# Patient Record
Sex: MALE | URBAN - METROPOLITAN AREA
[De-identification: names, ages, dates, MRNs, and addresses within clinical notes are randomized per-mention and may not be internally consistent; named-entity substitution may affect disease eponyms.]

---

## 2024-07-09 ENCOUNTER — HOSPITAL ENCOUNTER (OUTPATIENT)
Dept: LAB | Age: 54
Setting detail: SPECIMEN
Discharge: HOME | End: 2024-07-12

## 2024-07-09 PROCEDURE — 86735 MUMPS ANTIBODY: CPT

## 2024-07-09 PROCEDURE — 86765 RUBEOLA ANTIBODY: CPT

## 2024-07-09 PROCEDURE — 86762 RUBELLA ANTIBODY: CPT

## 2024-07-09 PROCEDURE — 36415 COLL VENOUS BLD VENIPUNCTURE: CPT

## 2024-07-09 PROCEDURE — 86787 VARICELLA-ZOSTER ANTIBODY: CPT

## 2024-10-02 ENCOUNTER — TELEPHONE (OUTPATIENT)
Dept: GASTROENTEROLOGY | Age: 54
End: 2024-10-02

## 2024-10-02 ENCOUNTER — TELEPHONE (OUTPATIENT)
Age: 54
End: 2024-10-02

## 2024-10-02 ENCOUNTER — OFFICE VISIT (OUTPATIENT)
Age: 54
End: 2024-10-02

## 2024-10-02 ENCOUNTER — PREP FOR PROCEDURE (OUTPATIENT)
Dept: GASTROENTEROLOGY | Age: 54
End: 2024-10-02

## 2024-10-02 VITALS
BODY MASS INDEX: 24.6 KG/M2 | HEIGHT: 78 IN | RESPIRATION RATE: 17 BRPM | WEIGHT: 212.6 LBS | OXYGEN SATURATION: 99 % | DIASTOLIC BLOOD PRESSURE: 80 MMHG | HEART RATE: 62 BPM | SYSTOLIC BLOOD PRESSURE: 118 MMHG

## 2024-10-02 DIAGNOSIS — D50.9 IRON DEFICIENCY ANEMIA, UNSPECIFIED IRON DEFICIENCY ANEMIA TYPE: Primary | ICD-10-CM

## 2024-10-02 DIAGNOSIS — Z86.0100 HX OF COLONIC POLYP: ICD-10-CM

## 2024-10-02 DIAGNOSIS — K21.9 CHRONIC GERD: ICD-10-CM

## 2024-10-02 DIAGNOSIS — Z12.11 ENCOUNTER FOR SCREENING COLONOSCOPY: Primary | ICD-10-CM

## 2024-10-02 RX ORDER — SODIUM CHLORIDE 0.9 % (FLUSH) 0.9 %
5-40 SYRINGE (ML) INJECTION PRN
Status: CANCELLED | OUTPATIENT
Start: 2024-10-02

## 2024-10-02 RX ORDER — SODIUM CHLORIDE 0.9 % (FLUSH) 0.9 %
5-40 SYRINGE (ML) INJECTION EVERY 12 HOURS SCHEDULED
Status: CANCELLED | OUTPATIENT
Start: 2024-10-02

## 2024-10-02 RX ORDER — OMEPRAZOLE 40 MG/1
40 CAPSULE, DELAYED RELEASE ORAL DAILY
COMMUNITY

## 2024-10-02 RX ORDER — PREGABALIN 25 MG/1
75 CAPSULE ORAL 2 TIMES DAILY
COMMUNITY

## 2024-10-02 RX ORDER — SODIUM, POTASSIUM,MAG SULFATES 17.5-3.13G
1 SOLUTION, RECONSTITUTED, ORAL ORAL ONCE
Qty: 1 EACH | Refills: 0 | Status: SHIPPED | OUTPATIENT
Start: 2024-10-02 | End: 2024-10-02

## 2024-10-02 RX ORDER — FAMOTIDINE 40 MG/1
40 TABLET, FILM COATED ORAL EVERY EVENING
Qty: 90 TABLET | Refills: 0 | Status: SHIPPED | OUTPATIENT
Start: 2024-10-02 | End: 2024-10-02 | Stop reason: SDUPTHER

## 2024-10-02 RX ORDER — CELECOXIB 100 MG/1
100 CAPSULE ORAL 2 TIMES DAILY
COMMUNITY

## 2024-10-02 RX ORDER — FAMOTIDINE 40 MG/1
40 TABLET, FILM COATED ORAL EVERY EVENING
Qty: 90 TABLET | Refills: 0 | Status: SHIPPED | OUTPATIENT
Start: 2024-10-02

## 2024-10-02 RX ORDER — SODIUM CHLORIDE 9 MG/ML
25 INJECTION, SOLUTION INTRAVENOUS PRN
Status: CANCELLED | OUTPATIENT
Start: 2024-10-02

## 2024-10-02 NOTE — TELEPHONE ENCOUNTER
SUPREP Bowel Preparation - Split Dosing  If you are scheduled at our                                          If you are scheduled at our                                 [facility] [place]:                                                         [facility] [place]:    [address], [facility] [place] [address]                                                               [place] [address]  [phone]       Items to purchase 5 days before your procedure:    Suprep -  prescription at your pharmacy.  Purchase one 10oz bottle of Magnesium Citrate  Purchase Dulcolax Laxative tablets (Not stool softener tablets).      Two days before your procedure:      Drink at least eight glasses of water today.  Stop eating high- fiber foods such as vegetables and beans until after your colonoscopy. You can eat all other types of food today.     Drink the 10 oz bottle of magnesium citrate after dinner.      The day before your Colonoscopy:    Clear liquids only the entire day (water, Gatorade, coffee, tea, Sprite, 7-up, Jell-O, popsicles, chicken / beef broth, apple juice).    No milk / No dairy products, NO RED, BLUE or PURPLE color liquids /dyes    4:00 pm Take 2 Dulcolax tablets.     6:00 pm - Pour one 6 oz bottle of Suprep liquid into the mixing container. Add cold water to the 16-oz line and mix. Drink all the solution over 10-15 minutes.   Drink two more 16-ounce glasses of water over the next hour.            The day of your procedure:    6 hours prior to arrival time of your procedure, pour one 6 oz bottle of Suprep liquid into the mixing container. Add cold water to the 16-oz line and mix. Drink all the solution over 10-15 minutes.

## 2024-10-02 NOTE — PROGRESS NOTES
Dawson Knight (:  1970) is a 54 y.o. male new patient referred to our office for evaluation of the following chief complaint(s):  New Patient        Assessment & Plan   ASSESSMENT/PLAN:  1. Iron deficiency anemia, unspecified iron deficiency anemia type  2. Chronic GERD  -     famotidine (PEPCID) 40 MG tablet; Take 1 tablet by mouth every evening, Disp-90 tablet, R-0Normal  3. Hx of colonic polyp    -Schedule EGD and colonoscopy. Reportedly had polyps in 2019. No prior EGD. Has uncontrolled nocturnal acid reflux despite Prilosec 40 mg BID AC. Add Pepcid 40 mg QHS. Follow-up after procedures to discuss results/adjust medications PRN.     Subjective   SUBJECTIVE/OBJECTIVE  Dawson Knight is a 54 y.o. year old male referred to our office for screening colonoscopy as well as EGD for prolonged PPI use.  [facility]  Noted to have persistent GERD despite Prilosec 40 mg twice daily with no prior EGD.  Also with history of ZOILA, Hgb 11.4.  Last colonoscopy May 2019 with polyps and 5-year recall.  Pathology not available for review.    Today patient denies nausea, vomiting, melena, dysphagia, odynophagia. He reports a 3+ year hx of alternating constipation, diarrhea, normal stools. Says this is intermittent. Typically has BM daily, sometimes more loose, sometimes more on the hard side. Rarely skips days. Occasionally has noticed BRBPR in the bowl and with wiping. Believes he may have internal hemorrhoids. Denies rectal pain or itching. Has some occasional epigastric pain associated with acid reflux. This typically wakes him up two hours after going to bed with burning in the esophagus. Typically drinks water or takes Tums for relief.  Denies significant postprandial symptoms. These symptoms are occurring despite Prilosec 40 mg BID AC started by cardiologist after cardiac work-up including stress test was negative for chest pain. Has previously tried

## 2024-10-02 NOTE — TELEPHONE ENCOUNTER
\"Lin Brian Amanda, RN  Hi,  Please send Suprep to the pharmacy on file.  Thanks,  Lin\"      Suprep and Pepcid prescriptions faxed to pt's [facility] pharmacy as ordered by Melissa Grinnell, PA. Fax: [phone].    Pharmacy info:   [facility], located within [facility]   [address], [place]   Phone: [phone]  Fax: [phone]

## 2024-10-18 RX ORDER — LISINOPRIL AND HYDROCHLOROTHIAZIDE 10; 12.5 MG/1; MG/1
1 TABLET ORAL DAILY
COMMUNITY

## 2024-10-18 NOTE — PERIOP NOTE
Patient verified name, , and procedure.    Type: 1a; abbreviated assessment per anesthesia guidelines    Labs per anesthesia: None    Instructed pt that they will be notified the day before their procedure by the GI Lab for time of arrival if their procedure is [place] and Pre-op for [place] cases. Arrival times should be called by 5 pm. If no phone is received the patient should contact their respective hospital. The GI lab telephone number is [phone] and ES Pre-op is [phone].     Follow diet and prep instructions per office including NPO status.      **Please drink 32 ounces of non-caffeinated clear liquids, on the day of surgery, 2 hours prior to your arrival time to avoid dehydration.     Bath or shower the night before and the am of surgery with non-moisturizing soap. No lotions, oils, powders, cologne on skin. No make up, eye make up or jewelry. Wear loose fitting comfortable, clean clothing.     Must have adult present in building the entire time .     Medications for the day of procedure Lyrica, Omeprazole, patient to hold vitamins, supplements, herbals 7 days prior to procedure and NSAIDs/Celebrex 5 days prior to procedure per anesthesia guidelines.     The following discharge instructions reviewed with patient: medication given during procedure may cause drowsiness for several hours, therefore, do not drive or operate machinery for remainder of the day. You may not drink alcohol on the day of your procedure, please resume regular diet and activity unless otherwise directed. You may experience abdominal distention for several hours that is relieved by the passage of gas. Contact your physician if you have any of the following: fever or chills, severe abdominal pain or excessive amount of bleeding or a large amount when having a bowel movement. Occasional specks of blood with bowel movement would not be unusual.

## 2024-10-25 ENCOUNTER — TELEPHONE (OUTPATIENT)
Age: 54
End: 2024-10-25

## 2024-10-25 DIAGNOSIS — Z12.11 ENCOUNTER FOR SCREENING COLONOSCOPY: Primary | ICD-10-CM

## 2024-10-25 RX ORDER — BISACODYL 5 MG/1
5 TABLET, DELAYED RELEASE ORAL SEE ADMIN INSTRUCTIONS
Qty: 2 TABLET | Refills: 0 | Status: SHIPPED | OUTPATIENT
Start: 2024-10-25

## 2024-10-25 RX ORDER — POLYETHYLENE GLYCOL 3350 17 G/17G
238 POWDER, FOR SOLUTION ORAL SEE ADMIN INSTRUCTIONS
Qty: 238 G | Refills: 0 | Status: SHIPPED | OUTPATIENT
Start: 2024-10-25

## 2024-10-25 NOTE — TELEPHONE ENCOUNTER
Received the following message regarding pt's colonoscopy prep -- per Juliette, \"patient is scheduled for colon with Alhaji on 10/30 [facility] / [facility] is [facility] the [facility] before [facility] / patient is requesting a call back asap.\"    -----------    Called pt to further discuss/confirm we send correct prep to the correct fax number for him to get prep in time for 10/30 procedure. Pt stated that his [facility] insurance is not covering Suprep and they are requesting an alternative. Pt to drop off form from [facility] today 10/25 around 1130, and medication order will be faxed after he drops off form. Told pt next alternative will most likely be Miralax OTC so we can try faxing rx if he thinks it will be covered, but otherwise he will pick it up OTC. Pt agreeable.

## 2024-10-25 NOTE — TELEPHONE ENCOUNTER
Pt presented to clinic to drop off hard copy of a letter from [facility] stating that Suprep will not be covered for his procedure. After reviewing form, based on options given by [facility] that will be approved and pt can obtain in time to prep prior to procedure 10/30, pt must be switched to Miralax Prep (including Miralax, Dulcolax, and Magnesium Citrate). Discussed with Dr. Mane's , Lin. Copy of Colonoscopy Prep instructions for Miralax/Dulcolax/Gatorade prep with extra Magnesium Citrate 10oz on 10/28 reviewed with pt in person by Lin, and hard copy given to pt to take home. Told pt we would reach out to [facility] today and attempt to fax RX, but if pt does not receive them all in time he must plan to  Miralax, Magnesium Citrate, and Dulcolax all OTC prior to 10/28. Pt verbalized understanding, agreeable to plan.     Copy of Letter from [facility] faxed to [facility] [employer] to upload to pt's chart. Confirmation fax received.    -----------    Miralax, Dulcolax, Magnesium Citrate prescriptions sent to [facility] via electronic fax as requested.

## 2024-10-29 ENCOUNTER — TELEPHONE (OUTPATIENT)
Age: 54
End: 2024-10-29

## 2024-10-29 NOTE — TELEPHONE ENCOUNTER
Called pt to confirm he was able to  everything he needed to start prep in time for colonoscopy tomorrow. Pt stated he did not receive anything [facility] the [facility] but ended up picking up everything himself OTC, and started by taking the Magnesium Citrate 10 oz last night 10/28 as instructed, and is following clear liquid diet today as instructed. Pt denied any questions at this time. Told pt to expect a call from pre-op today 10/29 between 1-4pm with his arrival time. Pt verbalized understanding.

## 2024-10-30 ENCOUNTER — HOSPITAL ENCOUNTER (OUTPATIENT)
Age: 54
Discharge: HOME | End: 2024-10-30
Attending: INTERNAL MEDICINE | Admitting: INTERNAL MEDICINE
Payer: OTHER GOVERNMENT

## 2024-10-30 ENCOUNTER — ANESTHESIA (OUTPATIENT)
Dept: ENDOSCOPY | Age: 54
End: 2024-10-30
Payer: OTHER GOVERNMENT

## 2024-10-30 ENCOUNTER — ANESTHESIA EVENT (OUTPATIENT)
Dept: ENDOSCOPY | Age: 54
End: 2024-10-30
Payer: OTHER GOVERNMENT

## 2024-10-30 VITALS
OXYGEN SATURATION: 97 % | RESPIRATION RATE: 15 BRPM | HEIGHT: 71 IN | TEMPERATURE: 98 F | BODY MASS INDEX: 28.98 KG/M2 | SYSTOLIC BLOOD PRESSURE: 104 MMHG | WEIGHT: 207 LBS | DIASTOLIC BLOOD PRESSURE: 65 MMHG | HEART RATE: 57 BPM

## 2024-10-30 PROCEDURE — 3700000000 HC ANESTHESIA ATTENDED CARE: Performed by: INTERNAL MEDICINE

## 2024-10-30 PROCEDURE — 3609027000 HC COLONOSCOPY: Performed by: INTERNAL MEDICINE

## 2024-10-30 PROCEDURE — 6360000002 HC RX W HCPCS

## 2024-10-30 PROCEDURE — 3700000001 HC ADD 15 MINUTES (ANESTHESIA): Performed by: INTERNAL MEDICINE

## 2024-10-30 PROCEDURE — 2580000003 HC RX 258: Performed by: INTERNAL MEDICINE

## 2024-10-30 PROCEDURE — 2500000003 HC RX 250 WO HCPCS

## 2024-10-30 PROCEDURE — 7100000011 HC PHASE II RECOVERY - ADDTL 15 MIN: Performed by: INTERNAL MEDICINE

## 2024-10-30 PROCEDURE — 2709999900 HC NON-CHARGEABLE SUPPLY: Performed by: INTERNAL MEDICINE

## 2024-10-30 PROCEDURE — 3609012400 HC EGD TRANSORAL BIOPSY SINGLE/MULTIPLE: Performed by: INTERNAL MEDICINE

## 2024-10-30 PROCEDURE — 7100000010 HC PHASE II RECOVERY - FIRST 15 MIN: Performed by: INTERNAL MEDICINE

## 2024-10-30 PROCEDURE — 88305 TISSUE EXAM BY PATHOLOGIST: CPT

## 2024-10-30 RX ORDER — SODIUM CHLORIDE 0.9 % (FLUSH) 0.9 %
5-40 SYRINGE (ML) INJECTION PRN
Status: DISCONTINUED | OUTPATIENT
Start: 2024-10-30 | End: 2024-10-30 | Stop reason: HOSPADM

## 2024-10-30 RX ORDER — SODIUM CHLORIDE 0.9 % (FLUSH) 0.9 %
5-40 SYRINGE (ML) INJECTION EVERY 12 HOURS SCHEDULED
Status: DISCONTINUED | OUTPATIENT
Start: 2024-10-30 | End: 2024-10-30 | Stop reason: HOSPADM

## 2024-10-30 RX ORDER — SODIUM CHLORIDE 9 MG/ML
25 INJECTION, SOLUTION INTRAVENOUS PRN
Status: DISCONTINUED | OUTPATIENT
Start: 2024-10-30 | End: 2024-10-30 | Stop reason: HOSPADM

## 2024-10-30 RX ORDER — LIDOCAINE HYDROCHLORIDE 20 MG/ML
INJECTION, SOLUTION EPIDURAL; INFILTRATION; INTRACAUDAL; PERINEURAL
Status: DISCONTINUED | OUTPATIENT
Start: 2024-10-30 | End: 2024-10-30 | Stop reason: SDUPTHER

## 2024-10-30 RX ORDER — PROPOFOL 10 MG/ML
INJECTION, EMULSION INTRAVENOUS
Status: DISCONTINUED | OUTPATIENT
Start: 2024-10-30 | End: 2024-10-30 | Stop reason: SDUPTHER

## 2024-10-30 RX ADMIN — SODIUM CHLORIDE, PRESERVATIVE FREE 5 ML: 5 INJECTION INTRAVENOUS at 09:10

## 2024-10-30 RX ADMIN — PROPOFOL 180 MCG/KG/MIN: 10 INJECTION, EMULSION INTRAVENOUS at 09:39

## 2024-10-30 RX ADMIN — LIDOCAINE HYDROCHLORIDE 50 MG: 20 INJECTION, SOLUTION EPIDURAL; INFILTRATION; INTRACAUDAL; PERINEURAL at 09:38

## 2024-10-30 RX ADMIN — PROPOFOL 50 MG: 10 INJECTION, EMULSION INTRAVENOUS at 09:38

## 2024-10-30 NOTE — H&P
HISTORY AND PHYSICAL             Date: 10/30/2024        Patient Name: Dawson Knight     YOB: 1970      Age:  54 y.o.      History of Present Illness   1. Iron deficiency anemia, unspecified iron deficiency anemia type  2. Chronic GERD  3. Hx of colonic polyp    Past Medical History     Past Medical History:   Diagnosis Date    GERD (gastroesophageal reflux disease)     managed with medication    Hypertension     managed with medication    Iron deficiency anemia     ADRIANA on CPAP         Past Surgical History     Past Surgical History:   Procedure Laterality Date    HIP ARTHROSCOPY W/ LABRAL REPAIR Right     ROTATOR CUFF REPAIR Right         Medications Prior to Admission     Prior to Admission medications    Medication Sig Start Date End Date Taking? Authorizing Provider   polyethylene glycol (GLYCOLAX) 17 GM/SCOOP powder Take 238 g by mouth See Admin Instructions -- Refer to colonoscopy prep instructions from [facility]. Call [phone] with any questions. 10/25/24  Yes Nate Mane MD   bisacodyl 5 MG EC tablet Take 1 tablet by mouth See Admin Instructions -- Refer to colonoscopy prep instructions from [facility]. Call [phone] with any questions. 10/25/24  Yes Nate Mane MD   magnesium citrate solution Take 296 mLs by mouth See Admin Instructions -- Refer to colonoscopy prep instructions from [facility]. Call [phone] with any questions. 10/25/24  Yes Nate Mane MD   lisinopril-hydroCHLOROthiazide (PRINZIDE;ZESTORETIC) 10-12.5 MG per tablet Take 1 tablet by mouth daily   Yes Rosalina Bowie MD   omeprazole (PRILOSEC) 40 MG delayed release capsule Take 1 capsule by mouth daily   Yes Rosalina Bowie MD   celecoxib (CELEBREX) 100 MG capsule Take 1 capsule by mouth 2 times daily   Yes Rosalina Bowie MD   pregabalin (LYRICA) 25 MG capsule Take 3 capsules by mouth 2 times

## 2024-10-30 NOTE — DISCHARGE INSTRUCTIONS
Gastrointestinal Esophagogastroduodenoscopy (EGD)/ Endoscopic Ultrasound(EUS)- Upper Exam Discharge Instructions    1. Call Dr. Mane for any problems or questions.  2. Contact the doctor's office for follow up appointment as directed.  3. Medication may cause drowsiness for several hours, therefore, do not drive or operate machinery for remainder of the day.  4. No alcohol today.  5. Do not make any important decisions such as signing legal paperwork.  6. Ordinarily, you may resume regular diet and activity after exam unless otherwise specified by your physician.  7. For mild soreness in your throat you may use Cepacol throat lozenges or warm  salt-water gargles as needed.    Findings: Evidence of a gastric bypass was found.   A gastric pouch with a normal size was found. The staple line appeared intact.   The gastrojejunal anastomosis was characterized by healthy appearing mucosa. This was traversed.   The pouch-to-jejunum limb was characterized by healthy appearing mucosa. The jejunojejunal anastomosis was characterized by healthy appearing mucosa.   Diffuse mild inflammation characterized by erythema was found in the gastric pouch. Biopsies were taken with a cold forceps for Helicobacter pylori testing.   The Z-line was irregular. Biopsies were taken with a cold forceps for histology.     Impression: Gastric bypass with a normal-sized pouch and intact staple line.   Gastrojejunal anastomosis characterized by healthy appearing mucosa.   Gastritis, characterized by erythema. Biopsied.   Z-line irregular. Biopsied.     Recommendation: Await pathology results.   Consider IV iron if ongoing issues with anemia, as iron deficiency is common in patients with gastric bypass.  Continue with current medications.   Avoid NSAIDs like Celebrex, Ibuprofen, Motrin       Gastrointestinal Colonoscopy/Flexible Sigmoidoscopy - Lower Exam Discharge Instructions  Call Dr. Mane for any problems or questions.  Contact the doctor’s

## 2024-10-30 NOTE — ANESTHESIA PRE PROCEDURE
List   Diagnosis Code    Iron deficiency anemia D50.9    Chronic GERD K21.9    Hx of colonic polyp Z86.0100       Past Medical History:        Diagnosis Date    GERD (gastroesophageal reflux disease)     managed with medication    Hypertension     managed with medication    Iron deficiency anemia     ADRIANA on CPAP        Past Surgical History:        Procedure Laterality Date    HIP ARTHROSCOPY W/ LABRAL REPAIR Right     ROTATOR CUFF REPAIR Right        Social History:    Social History     Tobacco Use    Smoking status: Never    Smokeless tobacco: Never   Substance Use Topics    Alcohol use: Yes     Comment: occ                                Counseling given: Not Answered      Vital Signs (Current):   Vitals:    10/18/24 1008   Weight: 93.9 kg (207 lb)   Height: 1.791 m (5' 10.5\")                                              BP Readings from Last 3 Encounters:   10/02/24 118/80       NPO Status: Time of last liquid consumption: 0400                        Time of last solid consumption: 1800                        Date of last liquid consumption: 10/30/24                        Date of last solid food consumption: 10/28/24    BMI:   Wt Readings from Last 3 Encounters:   10/18/24 93.9 kg (207 lb)   10/02/24 96.4 kg (212 lb 9.6 oz)     Body mass index is 29.28 kg/m².    CBC: No results found for: \"WBC\", \"RBC\", \"HGB\", \"HCT\", \"MCV\", \"RDW\", \"PLT\"    CMP: No results found for: \"NA\", \"K\", \"CL\", \"CO2\", \"BUN\", \"CREATININE\", \"GFRAA\", \"AGRATIO\", \"LABGLOM\", \"GLUCOSE\", \"GLU\", \"CALCIUM\", \"BILITOT\", \"ALKPHOS\", \"AST\", \"ALT\"    POC Tests: No results for input(s): \"POCGLU\", \"POCNA\", \"POCK\", \"POCCL\", \"POCBUN\", \"POCHEMO\", \"POCHCT\" in the last 72 hours.    Coags: No results found for: \"PROTIME\", \"INR\", \"APTT\"    HCG (If Applicable): No results found for: \"PREGTESTUR\", \"PREGSERUM\", \"HCG\", \"HCGQUANT\"     ABGs: No results found for: \"PHART\", \"PO2ART\", \"JDA8UOO\", \"JUE0CZV\", \"BEART\", \"K9ZSNZXQ\"     Type & Screen (If Applicable):  No results

## 2024-10-30 NOTE — ANESTHESIA POSTPROCEDURE EVALUATION
Department of Anesthesiology  Postprocedure Note    Patient: Dawson Knight  MRN: [id number]  YOB: 1970  Date of evaluation: 10/30/2024    Procedure Summary       Date: 10/30/24 [facility] / [facility]    Anesthesia Start: 0932 Anesthesia Stop: 1004    Procedures:       COLORECTAL CANCER SCREENING, NOT HIGH RISK      ESOPHAGOGASTRODUODENOSCOPY BIOPSY (Upper GI Region) Diagnosis:       Iron deficiency anemia, unspecified iron deficiency anemia type      Chronic GERD      Hx of colonic polyp      (Iron deficiency anemia, unspecified iron deficiency anemia type [D50.9])      (Chronic GERD [K21.9])      (Hx of colonic polyp [Z86.0100])    Surgeons: Nate Mane MD Responsible Provider: Phani Pulido MD    Anesthesia Type: TIVA ASA Status: 2            Anesthesia Type: No value filed.    Gloria Phase I:      Gloria Phase II: Gloria Score: 10    Anesthesia Post Evaluation    Patient location during evaluation: PACU  Patient participation: complete - patient participated  Level of consciousness: awake and alert  Airway patency: patent  Nausea & Vomiting: no nausea and no vomiting  Cardiovascular status: hemodynamically stable  Respiratory status: acceptable, nonlabored ventilation and spontaneous ventilation  Hydration status: euvolemic  Comments: /65   Pulse 57   Temp 98 °F (36.7 °C) (Temporal)   Resp 15   Ht 1.791 m (5' 10.5\")   Wt 93.9 kg (207 lb)   SpO2 97%   BMI 29.28 kg/m²     Multimodal analgesia pain management approach  Pain management: adequate and satisfactory to patient    No notable events documented.

## 2024-11-05 ENCOUNTER — TELEPHONE (OUTPATIENT)
Age: 54
End: 2024-11-05

## 2024-11-05 NOTE — TELEPHONE ENCOUNTER
Reviewed result with patient per . Left voicemail for patient to return call to schedule an follow up in office with pretty. Recall placed. If any questions or concerns patient can return call regarding results.

## 2024-12-19 NOTE — PROGRESS NOTES
NEW PATIENT INTAKE        Referral Diagnosis: ZOILA    Referring Provider: Tess Islas DO     Primary Care Provider: N/A    Presenting Symptoms: 3+ year hx of alternating constipation, diarrhea, normal stools. Has noticed BRBPR in the bowl and with wiping     Family History of Cancer: None    Personal History of Cancer: None    Family/ Social/ Medical/ Surgical History Updated  and Accurate in Epic: Yes    Chronological History of Pertinent Events (Last 3 months):   Colonoscopy and Endoscopy on 10/30/24 at [facility]. Pathology report in chart under Labs.  Colonoscopy revealed: Internal hemorrhoids were found during retroflexion. The hemorrhoids were moderate. The perianal and digital rectal examinations were normal. Multiple small-mouthed diverticula were found in the descending colon and sigmoid colon.     Pertinent Notes from Referring Provider: None    Other Pertinent Information: Patient has had a gastric bypass  Procedure note from Colonoscopy and  Endoscopy in [facility]

## 2024-12-20 ENCOUNTER — HOSPITAL ENCOUNTER (OUTPATIENT)
Dept: LAB | Age: 54
Discharge: HOME | End: 2024-12-20
Payer: OTHER GOVERNMENT

## 2024-12-20 ENCOUNTER — OFFICE VISIT (OUTPATIENT)
Dept: ONCOLOGY | Age: 54
End: 2024-12-20
Payer: OTHER GOVERNMENT

## 2024-12-20 VITALS
DIASTOLIC BLOOD PRESSURE: 85 MMHG | OXYGEN SATURATION: 97 % | SYSTOLIC BLOOD PRESSURE: 117 MMHG | BODY MASS INDEX: 29.94 KG/M2 | TEMPERATURE: 98 F | RESPIRATION RATE: 18 BRPM | HEIGHT: 71 IN | HEART RATE: 61 BPM | WEIGHT: 213.9 LBS

## 2024-12-20 DIAGNOSIS — D50.9 IRON DEFICIENCY ANEMIA, UNSPECIFIED IRON DEFICIENCY ANEMIA TYPE: ICD-10-CM

## 2024-12-20 DIAGNOSIS — D50.9 IRON DEFICIENCY ANEMIA, UNSPECIFIED IRON DEFICIENCY ANEMIA TYPE: Primary | ICD-10-CM

## 2024-12-20 LAB
25(OH)D3 SERPL-MCNC: 29.6 NG/ML (ref 30–100)
ALBUMIN SERPL-MCNC: 3.8 G/DL (ref 3.5–5)
ALBUMIN/GLOB SERPL: 1.2 (ref 1–1.9)
ALP SERPL-CCNC: 71 U/L (ref 40–129)
ALT SERPL-CCNC: 23 U/L (ref 8–55)
ANION GAP SERPL CALC-SCNC: 11 MMOL/L (ref 7–16)
AST SERPL-CCNC: 48 U/L (ref 15–37)
BASOPHILS # BLD: 0.1 K/UL (ref 0–0.2)
BASOPHILS NFR BLD: 1 % (ref 0–2)
BILIRUB SERPL-MCNC: <0.2 MG/DL (ref 0–1.2)
BUN SERPL-MCNC: 13 MG/DL (ref 6–23)
CALCIUM SERPL-MCNC: 9.6 MG/DL (ref 8.8–10.2)
CHLORIDE SERPL-SCNC: 103 MMOL/L (ref 98–107)
CO2 SERPL-SCNC: 28 MMOL/L (ref 20–29)
CREAT SERPL-MCNC: 0.93 MG/DL (ref 0.8–1.3)
DIFFERENTIAL METHOD BLD: ABNORMAL
EOSINOPHIL # BLD: 0.1 K/UL (ref 0–0.8)
EOSINOPHIL NFR BLD: 2 % (ref 0.5–7.8)
ERYTHROCYTE [DISTWIDTH] IN BLOOD BY AUTOMATED COUNT: 19.5 % (ref 11.9–14.6)
FERRITIN SERPL-MCNC: 6 NG/ML (ref 8–388)
FOLATE SERPL-MCNC: 8.1 NG/ML (ref 3.1–17.5)
GLOBULIN SER CALC-MCNC: 3.3 G/DL (ref 2.3–3.5)
GLUCOSE SERPL-MCNC: 91 MG/DL (ref 70–99)
HCT VFR BLD AUTO: 39.8 % (ref 41.1–50.3)
HGB BLD-MCNC: 11.6 G/DL (ref 13.6–17.2)
HGB RETIC QN AUTO: 19 PG (ref 29–35)
IMM GRANULOCYTES # BLD AUTO: 0 K/UL (ref 0–0.5)
IMM GRANULOCYTES NFR BLD AUTO: 0 % (ref 0–5)
IMM RETICS NFR: 16.4 % (ref 2.3–13.4)
IRON SATN MFR SERPL: 2 % (ref 20–50)
IRON SERPL-MCNC: 10 UG/DL (ref 35–100)
LYMPHOCYTES # BLD: 1.5 K/UL (ref 0.5–4.6)
LYMPHOCYTES NFR BLD: 22 % (ref 13–44)
MAGNESIUM SERPL-MCNC: 2.2 MG/DL (ref 1.8–2.4)
MCH RBC QN AUTO: 19.1 PG (ref 26.1–32.9)
MCHC RBC AUTO-ENTMCNC: 29.1 G/DL (ref 31.4–35)
MCV RBC AUTO: 65.7 FL (ref 82–102)
MONOCYTES # BLD: 0.7 K/UL (ref 0.1–1.3)
MONOCYTES NFR BLD: 10 % (ref 4–12)
NEUTS SEG # BLD: 4.5 K/UL (ref 1.7–8.2)
NEUTS SEG NFR BLD: 65 % (ref 43–78)
NRBC # BLD: 0 K/UL (ref 0–0.2)
PLATELET # BLD AUTO: 400 K/UL (ref 150–450)
PMV BLD AUTO: 8.5 FL (ref 9.4–12.3)
POTASSIUM SERPL-SCNC: 4.3 MMOL/L (ref 3.5–5.1)
PROT SERPL-MCNC: 7.1 G/DL (ref 6.3–8.2)
RBC # BLD AUTO: 6.06 M/UL (ref 4.23–5.6)
RETICS # AUTO: 0.05 M/UL (ref 0.03–0.1)
RETICS/RBC NFR AUTO: 0.9 % (ref 0.3–2)
SODIUM SERPL-SCNC: 142 MMOL/L (ref 136–145)
TIBC SERPL-MCNC: 436 UG/DL (ref 240–450)
UIBC SERPL-MCNC: 426 UG/DL (ref 112–347)
VIT B12 SERPL-MCNC: 968 PG/ML (ref 193–986)
WBC # BLD AUTO: 6.9 K/UL (ref 4.3–11.1)

## 2024-12-20 PROCEDURE — 82746 ASSAY OF FOLIC ACID SERUM: CPT

## 2024-12-20 PROCEDURE — 82306 VITAMIN D 25 HYDROXY: CPT

## 2024-12-20 PROCEDURE — 82728 ASSAY OF FERRITIN: CPT

## 2024-12-20 PROCEDURE — 99243 OFF/OP CNSLTJ NEW/EST LOW 30: CPT | Performed by: INTERNAL MEDICINE

## 2024-12-20 PROCEDURE — 85046 RETICYTE/HGB CONCENTRATE: CPT

## 2024-12-20 PROCEDURE — 83540 ASSAY OF IRON: CPT

## 2024-12-20 PROCEDURE — 36415 COLL VENOUS BLD VENIPUNCTURE: CPT

## 2024-12-20 PROCEDURE — 84630 ASSAY OF ZINC: CPT

## 2024-12-20 PROCEDURE — 83921 ORGANIC ACID SINGLE QUANT: CPT

## 2024-12-20 PROCEDURE — 85025 COMPLETE CBC W/AUTO DIFF WBC: CPT

## 2024-12-20 PROCEDURE — 80053 COMPREHEN METABOLIC PANEL: CPT

## 2024-12-20 PROCEDURE — 82525 ASSAY OF COPPER: CPT

## 2024-12-20 PROCEDURE — 82607 VITAMIN B-12: CPT

## 2024-12-20 PROCEDURE — 83735 ASSAY OF MAGNESIUM: CPT

## 2024-12-20 PROCEDURE — 83550 IRON BINDING TEST: CPT

## 2024-12-20 RX ORDER — TRAZODONE HYDROCHLORIDE 100 MG/1
1 TABLET ORAL DAILY
COMMUNITY

## 2024-12-20 RX ORDER — SODIUM CHLORIDE 0.9 % (FLUSH) 0.9 %
5-40 SYRINGE (ML) INJECTION PRN
OUTPATIENT
Start: 2024-12-27

## 2024-12-20 RX ORDER — ONDANSETRON 2 MG/ML
8 INJECTION INTRAMUSCULAR; INTRAVENOUS
OUTPATIENT
Start: 2024-12-27

## 2024-12-20 RX ORDER — DIPHENHYDRAMINE HYDROCHLORIDE 50 MG/ML
50 INJECTION INTRAMUSCULAR; INTRAVENOUS
OUTPATIENT
Start: 2024-12-27

## 2024-12-20 RX ORDER — FAMOTIDINE 10 MG/ML
20 INJECTION, SOLUTION INTRAVENOUS
OUTPATIENT
Start: 2024-12-27

## 2024-12-20 RX ORDER — ACETAMINOPHEN 325 MG/1
650 TABLET ORAL
OUTPATIENT
Start: 2024-12-27

## 2024-12-20 RX ORDER — EPINEPHRINE 1 MG/ML
0.3 INJECTION, SOLUTION, CONCENTRATE INTRAVENOUS PRN
OUTPATIENT
Start: 2024-12-27

## 2024-12-20 RX ORDER — HEPARIN SODIUM (PORCINE) LOCK FLUSH IV SOLN 100 UNIT/ML 100 UNIT/ML
500 SOLUTION INTRAVENOUS PRN
OUTPATIENT
Start: 2024-12-27

## 2024-12-20 RX ORDER — HYDROCORTISONE SODIUM SUCCINATE 100 MG/2ML
100 INJECTION INTRAMUSCULAR; INTRAVENOUS
OUTPATIENT
Start: 2024-12-27

## 2024-12-20 RX ORDER — SODIUM CHLORIDE 9 MG/ML
5-250 INJECTION, SOLUTION INTRAVENOUS PRN
OUTPATIENT
Start: 2024-12-27

## 2024-12-20 RX ORDER — ALBUTEROL SULFATE 90 UG/1
4 INHALANT RESPIRATORY (INHALATION) PRN
OUTPATIENT
Start: 2024-12-27

## 2024-12-20 RX ORDER — SODIUM CHLORIDE 9 MG/ML
INJECTION, SOLUTION INTRAVENOUS CONTINUOUS
OUTPATIENT
Start: 2024-12-27

## 2024-12-20 NOTE — PROGRESS NOTES
[facility]  [address] [place] [address]  Guillermo Jenkins MD    Patient Name Dawson Knight   MRN [id number]   Date 12/20/24     Hematology/Oncology Diagnosis  ZOILA    History of Present Illness  Dawson Knight is a 54 y.o. with ADRIANA on CPAP, hx of gastric bypass ~20 years prior, chronic back pain who presents to establish care.     With his wife today to establish care.  [facility].  Referred to gastroenterology and recent EGD/c-scope 10/2024 were negative for signs of bleeding.    Today he tells me he feels chronic fatigue, struggles with restless leg syndrome and muscle aches.  Additionally endorses feeling cold most the time.  Since his gastric bypass he has not required IV iron but does take sublingual B12.  Works as a PA and pain management.  No melena or BRBPR.  Eats a healthy, well-balanced diet.  No unintentional weight loss, fevers or night sweats.  Considering an implant/hypoglossal nerve stimulator to treat his obstructive sleep apnea.  Former [employer].    ROS   12 point review of system negative except as noted in HPI    Past Medical History:   Diagnosis Date    GERD (gastroesophageal reflux disease)     managed with medication    Hypertension     managed with medication    Iron deficiency anemia     ADRIANA on CPAP         Past Surgical History:   Procedure Laterality Date    COLONOSCOPY N/A 10/30/2024    COLORECTAL CANCER SCREENING, NOT HIGH RISK performed by Nate Mane MD at [facility]    HIP ARTHROSCOPY W/ LABRAL REPAIR Right     ROTATOR CUFF REPAIR Right     UPPER GASTROINTESTINAL ENDOSCOPY N/A 10/30/2024    ESOPHAGOGASTRODUODENOSCOPY BIOPSY performed by Nate Mane MD at [facility]        Social History     Socioeconomic History    Marital status: Unknown     Spouse name: Not on file    Number of children: Not on file    Years of education: Not on file    Highest education level: Not on

## 2024-12-20 NOTE — PATIENT INSTRUCTIONS
Patient Information from Today's Visit    Diagnosis: Iron Deficiency Anemia      Follow Up Instructions: 3 Months      Treatment Summary has been discussed and given to patient: N/A      Current Labs: Labs to be drawn today          Please refer to After Visit Summary or [facility] for upcoming appointment information. If you have any questions regarding your upcoming schedule please reach out to your care team through [facility] or call [phone].    Please notify your assigned Nurse Navigator of any unplanned hospital admissions or Emergency Room visits within 24 hours of discharge.    -------------------------------------------------------------------------------------------------------------------  Please call our office at [phone] if you have any  of the following symptoms:   Fever of 100.5 or greater  Chills  Shortness of breath  Swelling or pain in one leg    After office hours an answering service is available and will contact a provider for emergencies or if you are experiencing any of the above symptoms.        EILEEN WOLFE MA

## 2024-12-23 LAB
COPPER SERPL-MCNC: 104 UG/DL (ref 69–132)
METHYLMALONATE SERPL-SCNC: 135 NMOL/L (ref 0–378)
ZINC SERPL-MCNC: 59 UG/DL (ref 44–115)

## 2024-12-28 ENCOUNTER — HOSPITAL ENCOUNTER (OUTPATIENT)
Dept: INFUSION THERAPY | Age: 54
Setting detail: INFUSION SERIES
Discharge: HOME | End: 2024-12-28
Payer: OTHER GOVERNMENT

## 2024-12-28 VITALS
OXYGEN SATURATION: 97 % | SYSTOLIC BLOOD PRESSURE: 124 MMHG | DIASTOLIC BLOOD PRESSURE: 75 MMHG | TEMPERATURE: 98.1 F | RESPIRATION RATE: 18 BRPM | HEART RATE: 55 BPM

## 2024-12-28 DIAGNOSIS — D50.8 OTHER IRON DEFICIENCY ANEMIA: Primary | ICD-10-CM

## 2024-12-28 PROCEDURE — 6360000002 HC RX W HCPCS: Performed by: INTERNAL MEDICINE

## 2024-12-28 PROCEDURE — 96365 THER/PROPH/DIAG IV INF INIT: CPT

## 2024-12-28 PROCEDURE — 2500000003 HC RX 250 WO HCPCS: Performed by: INTERNAL MEDICINE

## 2024-12-28 PROCEDURE — 2580000003 HC RX 258: Performed by: INTERNAL MEDICINE

## 2024-12-28 RX ORDER — ACETAMINOPHEN 325 MG/1
650 TABLET ORAL
Status: CANCELLED | OUTPATIENT
Start: 2025-01-04

## 2024-12-28 RX ORDER — DIPHENHYDRAMINE HYDROCHLORIDE 50 MG/ML
50 INJECTION INTRAMUSCULAR; INTRAVENOUS
Status: CANCELLED | OUTPATIENT
Start: 2025-01-04

## 2024-12-28 RX ORDER — DIPHENHYDRAMINE HYDROCHLORIDE 50 MG/ML
50 INJECTION INTRAMUSCULAR; INTRAVENOUS
Status: DISCONTINUED | OUTPATIENT
Start: 2024-12-28 | End: 2024-12-29 | Stop reason: HOSPADM

## 2024-12-28 RX ORDER — SODIUM CHLORIDE 9 MG/ML
5-250 INJECTION, SOLUTION INTRAVENOUS PRN
Status: CANCELLED | OUTPATIENT
Start: 2025-01-04

## 2024-12-28 RX ORDER — HYDROCORTISONE SODIUM SUCCINATE 100 MG/2ML
100 INJECTION INTRAMUSCULAR; INTRAVENOUS
Status: CANCELLED | OUTPATIENT
Start: 2025-01-04

## 2024-12-28 RX ORDER — ONDANSETRON 2 MG/ML
8 INJECTION INTRAMUSCULAR; INTRAVENOUS
Status: CANCELLED | OUTPATIENT
Start: 2025-01-04

## 2024-12-28 RX ORDER — ACETAMINOPHEN 325 MG/1
650 TABLET ORAL
Status: DISCONTINUED | OUTPATIENT
Start: 2024-12-28 | End: 2024-12-29 | Stop reason: HOSPADM

## 2024-12-28 RX ORDER — ALBUTEROL SULFATE 90 UG/1
4 INHALANT RESPIRATORY (INHALATION) PRN
Status: CANCELLED | OUTPATIENT
Start: 2025-01-04

## 2024-12-28 RX ORDER — SODIUM CHLORIDE 0.9 % (FLUSH) 0.9 %
5-40 SYRINGE (ML) INJECTION PRN
Status: CANCELLED | OUTPATIENT
Start: 2025-01-04

## 2024-12-28 RX ORDER — EPINEPHRINE 1 MG/ML
0.3 INJECTION, SOLUTION, CONCENTRATE INTRAVENOUS PRN
Status: CANCELLED | OUTPATIENT
Start: 2025-01-04

## 2024-12-28 RX ORDER — HEPARIN 100 UNIT/ML
500 SYRINGE INTRAVENOUS PRN
Status: CANCELLED | OUTPATIENT
Start: 2025-01-04

## 2024-12-28 RX ORDER — HYDROCORTISONE SODIUM SUCCINATE 100 MG/2ML
100 INJECTION INTRAMUSCULAR; INTRAVENOUS
Status: DISCONTINUED | OUTPATIENT
Start: 2024-12-28 | End: 2024-12-29 | Stop reason: HOSPADM

## 2024-12-28 RX ORDER — EPINEPHRINE 1 MG/ML
0.3 INJECTION, SOLUTION, CONCENTRATE INTRAVENOUS PRN
Status: DISCONTINUED | OUTPATIENT
Start: 2024-12-28 | End: 2024-12-29 | Stop reason: HOSPADM

## 2024-12-28 RX ORDER — SODIUM CHLORIDE 9 MG/ML
INJECTION, SOLUTION INTRAVENOUS CONTINUOUS
Status: DISCONTINUED | OUTPATIENT
Start: 2024-12-28 | End: 2024-12-29 | Stop reason: HOSPADM

## 2024-12-28 RX ORDER — ONDANSETRON 2 MG/ML
8 INJECTION INTRAMUSCULAR; INTRAVENOUS
Status: DISCONTINUED | OUTPATIENT
Start: 2024-12-28 | End: 2024-12-29 | Stop reason: HOSPADM

## 2024-12-28 RX ORDER — SODIUM CHLORIDE 9 MG/ML
5-250 INJECTION, SOLUTION INTRAVENOUS PRN
Status: DISCONTINUED | OUTPATIENT
Start: 2024-12-28 | End: 2024-12-29 | Stop reason: HOSPADM

## 2024-12-28 RX ORDER — SODIUM CHLORIDE 0.9 % (FLUSH) 0.9 %
5-40 SYRINGE (ML) INJECTION PRN
Status: DISCONTINUED | OUTPATIENT
Start: 2024-12-28 | End: 2024-12-29 | Stop reason: HOSPADM

## 2024-12-28 RX ORDER — ALBUTEROL SULFATE 90 UG/1
4 INHALANT RESPIRATORY (INHALATION) PRN
Status: DISCONTINUED | OUTPATIENT
Start: 2024-12-28 | End: 2024-12-29 | Stop reason: HOSPADM

## 2024-12-28 RX ORDER — SODIUM CHLORIDE 9 MG/ML
INJECTION, SOLUTION INTRAVENOUS CONTINUOUS
Status: CANCELLED | OUTPATIENT
Start: 2025-01-04

## 2024-12-28 RX ADMIN — FERRIC CARBOXYMALTOSE INJECTION 750 MG: 50 INJECTION, SOLUTION INTRAVENOUS at 09:09

## 2024-12-28 RX ADMIN — SODIUM CHLORIDE, PRESERVATIVE FREE 10 ML: 5 INJECTION INTRAVENOUS at 08:35

## 2024-12-28 ASSESSMENT — PAIN DESCRIPTION - DESCRIPTORS: DESCRIPTORS: ACHING

## 2024-12-28 ASSESSMENT — PAIN SCALES - GENERAL: PAINLEVEL_OUTOF10: 5

## 2024-12-28 ASSESSMENT — PAIN DESCRIPTION - ORIENTATION: ORIENTATION: LOWER

## 2024-12-28 ASSESSMENT — PAIN DESCRIPTION - LOCATION: LOCATION: BACK;LEG

## 2024-12-28 NOTE — PROGRESS NOTES
Arrived to the Infusion Center.  Injectafer infusion completed.  Patient tolerated well.   Any issues or concerns during appointment: none. 30 minute observation period completed.  Patient aware of next infusion appointment on 01/04/2025 (date) at 0830 (time).  Discharged ambulatory.

## 2025-01-04 ENCOUNTER — HOSPITAL ENCOUNTER (OUTPATIENT)
Dept: INFUSION THERAPY | Age: 55
Setting detail: INFUSION SERIES
Discharge: HOME | End: 2025-01-04
Payer: OTHER GOVERNMENT

## 2025-01-04 VITALS
SYSTOLIC BLOOD PRESSURE: 113 MMHG | TEMPERATURE: 98.2 F | DIASTOLIC BLOOD PRESSURE: 69 MMHG | RESPIRATION RATE: 16 BRPM | HEART RATE: 60 BPM | OXYGEN SATURATION: 99 %

## 2025-01-04 DIAGNOSIS — D50.8 OTHER IRON DEFICIENCY ANEMIA: Primary | ICD-10-CM

## 2025-01-04 PROCEDURE — 6360000002 HC RX W HCPCS: Performed by: INTERNAL MEDICINE

## 2025-01-04 PROCEDURE — 2500000003 HC RX 250 WO HCPCS: Performed by: INTERNAL MEDICINE

## 2025-01-04 PROCEDURE — 2580000003 HC RX 258: Performed by: INTERNAL MEDICINE

## 2025-01-04 PROCEDURE — 96365 THER/PROPH/DIAG IV INF INIT: CPT

## 2025-01-04 RX ORDER — ALBUTEROL SULFATE 90 UG/1
4 INHALANT RESPIRATORY (INHALATION) PRN
Status: DISCONTINUED | OUTPATIENT
Start: 2025-01-04 | End: 2025-01-05 | Stop reason: HOSPADM

## 2025-01-04 RX ORDER — SODIUM CHLORIDE 9 MG/ML
INJECTION, SOLUTION INTRAVENOUS CONTINUOUS
OUTPATIENT
Start: 2025-01-04

## 2025-01-04 RX ORDER — DIPHENHYDRAMINE HYDROCHLORIDE 50 MG/ML
50 INJECTION INTRAMUSCULAR; INTRAVENOUS
Status: DISCONTINUED | OUTPATIENT
Start: 2025-01-04 | End: 2025-01-05 | Stop reason: HOSPADM

## 2025-01-04 RX ORDER — DIPHENHYDRAMINE HYDROCHLORIDE 50 MG/ML
50 INJECTION INTRAMUSCULAR; INTRAVENOUS
OUTPATIENT
Start: 2025-01-04

## 2025-01-04 RX ORDER — HYDROCORTISONE SODIUM SUCCINATE 100 MG/2ML
100 INJECTION INTRAMUSCULAR; INTRAVENOUS
Status: DISCONTINUED | OUTPATIENT
Start: 2025-01-04 | End: 2025-01-05 | Stop reason: HOSPADM

## 2025-01-04 RX ORDER — ONDANSETRON 2 MG/ML
8 INJECTION INTRAMUSCULAR; INTRAVENOUS
OUTPATIENT
Start: 2025-01-04

## 2025-01-04 RX ORDER — EPINEPHRINE 1 MG/ML
0.3 INJECTION, SOLUTION, CONCENTRATE INTRAVENOUS PRN
Status: DISCONTINUED | OUTPATIENT
Start: 2025-01-04 | End: 2025-01-05 | Stop reason: HOSPADM

## 2025-01-04 RX ORDER — HYDROCORTISONE SODIUM SUCCINATE 100 MG/2ML
100 INJECTION INTRAMUSCULAR; INTRAVENOUS
OUTPATIENT
Start: 2025-01-04

## 2025-01-04 RX ORDER — ACETAMINOPHEN 325 MG/1
650 TABLET ORAL
Status: DISCONTINUED | OUTPATIENT
Start: 2025-01-04 | End: 2025-01-05 | Stop reason: HOSPADM

## 2025-01-04 RX ORDER — EPINEPHRINE 1 MG/ML
0.3 INJECTION, SOLUTION, CONCENTRATE INTRAVENOUS PRN
OUTPATIENT
Start: 2025-01-04

## 2025-01-04 RX ORDER — HEPARIN 100 UNIT/ML
500 SYRINGE INTRAVENOUS PRN
OUTPATIENT
Start: 2025-01-04

## 2025-01-04 RX ORDER — SODIUM CHLORIDE 0.9 % (FLUSH) 0.9 %
5-40 SYRINGE (ML) INJECTION PRN
OUTPATIENT
Start: 2025-01-04

## 2025-01-04 RX ORDER — SODIUM CHLORIDE 9 MG/ML
5-250 INJECTION, SOLUTION INTRAVENOUS PRN
OUTPATIENT
Start: 2025-01-04

## 2025-01-04 RX ORDER — ALBUTEROL SULFATE 90 UG/1
4 INHALANT RESPIRATORY (INHALATION) PRN
OUTPATIENT
Start: 2025-01-04

## 2025-01-04 RX ORDER — ACETAMINOPHEN 325 MG/1
650 TABLET ORAL
OUTPATIENT
Start: 2025-01-04

## 2025-01-04 RX ORDER — SODIUM CHLORIDE 0.9 % (FLUSH) 0.9 %
5-40 SYRINGE (ML) INJECTION PRN
Status: DISCONTINUED | OUTPATIENT
Start: 2025-01-04 | End: 2025-01-05 | Stop reason: HOSPADM

## 2025-01-04 RX ORDER — ONDANSETRON 2 MG/ML
8 INJECTION INTRAMUSCULAR; INTRAVENOUS
Status: DISCONTINUED | OUTPATIENT
Start: 2025-01-04 | End: 2025-01-05 | Stop reason: HOSPADM

## 2025-01-04 RX ADMIN — SODIUM CHLORIDE, PRESERVATIVE FREE 10 ML: 5 INJECTION INTRAVENOUS at 08:50

## 2025-01-04 RX ADMIN — SODIUM CHLORIDE 750 MG: 9 INJECTION, SOLUTION INTRAVENOUS at 09:16

## 2025-01-04 ASSESSMENT — PAIN DESCRIPTION - DESCRIPTORS: DESCRIPTORS: ACHING

## 2025-01-04 ASSESSMENT — PAIN DESCRIPTION - LOCATION: LOCATION: BACK

## 2025-01-04 ASSESSMENT — PAIN SCALES - GENERAL: PAINLEVEL_OUTOF10: 5

## 2025-01-04 ASSESSMENT — PAIN DESCRIPTION - FREQUENCY: FREQUENCY: CONTINUOUS

## 2025-01-04 ASSESSMENT — PAIN DESCRIPTION - ONSET: ONSET: ON-GOING

## 2025-01-04 ASSESSMENT — PAIN DESCRIPTION - ORIENTATION: ORIENTATION: LOWER

## 2025-01-04 ASSESSMENT — PAIN DESCRIPTION - PAIN TYPE: TYPE: CHRONIC PAIN

## 2025-01-04 ASSESSMENT — PAIN - FUNCTIONAL ASSESSMENT: PAIN_FUNCTIONAL_ASSESSMENT: PREVENTS OR INTERFERES SOME ACTIVE ACTIVITIES AND ADLS

## 2025-01-04 NOTE — PROGRESS NOTES
[facility] the [facility].  Injectafer infusion completed. Patient tolerated well.   Any issues or concerns during appointment: none.  Patient aware of next lab and [facility] office visit on 3/21/25 (date) at 1410 (time).  Patient instructed to call provider with temperature of 100.4 or greater or nausea/vomiting/ diarrhea or pain not controlled by medications  Discharged home ambulatory after 30  minute observation period.    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 He was able to bike for 5 miles total. He was sore after last session, thinking it was the bike. Was able to walk nearly 6 miles when in [place].   Current Functional Limitations: Long distance walking and biking  Squatting    Pain / Symptoms  - Pain rating (out of 10): Current: 2       OBJECTIVE                                                                                                                                    Outcome/Assessments  Outcome Measures:   HOOS, JR Raw Score: 5  HOOS Jr Calculated Score: 73.47  (interval score: 0=total hip disability to 100=perfect hip health) see flowsheet for additional documentation    Treatment     Therapeutic Exercise  Stationary bike seat 8 level 8 x 5 minutes  Step ups 6 inch step without hands x 15  Step ups 6 inch + air ex x 15  Step downs forward 6 inch step x 15  Step downs forward 4 inch step slowly x 15  Marching on air ex x 20  Standing hip abduction 2 x 10 bilaterally  Standing hip extension 2 x 10 bilaterally    Skilled input: verbal instruction/cues and as detailed above    Writer verbally educated and received verbal consent for hand placement, positioning of patient, and techniques to be performed today from patient for therapist position for techniques and hand placement and palpation for techniques as described above and how they are pertinent to the patient's plan of care.  Home Exercise Program  Access Code: [email or web address]  URL: [email or web address]  Date: 02/15/2024  Prepared by: Kristin Medrano    Exercises  - Seated Long Arc Quad  - 3 x  daily - 1-2 sets - 10 reps - 5 seconds hold  - Standing Hip Abduction  - 3 x daily - 1-2 sets - 10 reps  - Standing Hip Extension  - 3 x daily - 1-2 sets - 10 reps  - Mini Squat with Counter Support  - 3 x daily - 5-10 reps      ASSESSMENT                                                                                                          To date the patient has made gains as expected.    Patient made good progress in therapy and was able to meet his goals. Encouraged him to keep up with sit to stand and hip abduction exercises a few times per week.   Pain/symptoms after session (out of 10): 2  Education:   - Results of above outlined education: Verbalizes understanding and Demonstrates understanding    PLAN                                                                                                                           Discharge from skilled therapy with instructions/recommendations to: continue home exercise program    Suggestions for next session as indicated: Progress per plan of care    Goals  Decrease pain/symptoms to 0/10  Improve involved strength to 5/5  Improve involved ROM to 110 hip flexion  The above improvements in impairments to assist in obtaining goals listed below  Long Term Goals: to be met by end of plan of care  1. Patient will demonstrate ability to negotiate level and unlevel surfaces at variable velocities, including change of direction without increased pain or instability to return to age appropriate and community activities at prior level of function. Status: met   2. Patient will ascend and descend 1 flight of steps and without pain for safe access to laundry room Status: met   3. Patient will bend/squat without reported difficulty for completion of household tasks such as cleaning Status: met       Therapy procedure time and total treatment time can be found documented on the Time Entry flowsheet

## 2025-03-19 DIAGNOSIS — D50.9 IRON DEFICIENCY ANEMIA, UNSPECIFIED IRON DEFICIENCY ANEMIA TYPE: ICD-10-CM

## 2025-03-19 LAB
25(OH)D3 SERPL-MCNC: 21.7 NG/ML (ref 30–100)
ALBUMIN SERPL-MCNC: 4 G/DL (ref 3.5–5)
ALBUMIN/GLOB SERPL: 1.5 (ref 1–1.9)
ALP SERPL-CCNC: 57 U/L (ref 40–129)
ALT SERPL-CCNC: 42 U/L (ref 8–55)
ANION GAP SERPL CALC-SCNC: 11 MMOL/L (ref 7–16)
AST SERPL-CCNC: 35 U/L (ref 15–37)
BASOPHILS # BLD: 0.04 K/UL (ref 0–0.2)
BASOPHILS NFR BLD: 0.5 % (ref 0–2)
BILIRUB SERPL-MCNC: 0.5 MG/DL (ref 0–1.2)
BUN SERPL-MCNC: 14 MG/DL (ref 6–23)
CALCIUM SERPL-MCNC: 10.2 MG/DL (ref 8.8–10.2)
CHLORIDE SERPL-SCNC: 102 MMOL/L (ref 98–107)
CO2 SERPL-SCNC: 28 MMOL/L (ref 20–29)
CREAT SERPL-MCNC: 0.93 MG/DL (ref 0.8–1.3)
DIFFERENTIAL METHOD BLD: ABNORMAL
EOSINOPHIL # BLD: 0.04 K/UL (ref 0–0.8)
EOSINOPHIL NFR BLD: 0.5 % (ref 0.5–7.8)
ERYTHROCYTE [DISTWIDTH] IN BLOOD BY AUTOMATED COUNT: 21.9 % (ref 11.9–14.6)
FERRITIN SERPL-MCNC: 132 NG/ML (ref 8–388)
FOLATE SERPL-MCNC: 8.5 NG/ML (ref 3.1–17.5)
GLOBULIN SER CALC-MCNC: 2.7 G/DL (ref 2.3–3.5)
GLUCOSE SERPL-MCNC: 88 MG/DL (ref 70–99)
HCT VFR BLD AUTO: 50.1 % (ref 41.1–50.3)
HGB BLD-MCNC: 16.1 G/DL (ref 13.6–17.2)
HGB RETIC QN AUTO: 34 PG (ref 29–35)
IMM GRANULOCYTES # BLD AUTO: 0.04 K/UL (ref 0–0.5)
IMM GRANULOCYTES NFR BLD AUTO: 0.5 % (ref 0–5)
IMM RETICS NFR: 5.3 % (ref 2.3–13.4)
IRON SATN MFR SERPL: 42 % (ref 20–50)
IRON SERPL-MCNC: 153 UG/DL (ref 35–100)
LYMPHOCYTES # BLD: 1.67 K/UL (ref 0.5–4.6)
LYMPHOCYTES NFR BLD: 21.1 % (ref 13–44)
MCH RBC QN AUTO: 26.3 PG (ref 26.1–32.9)
MCHC RBC AUTO-ENTMCNC: 32.1 G/DL (ref 31.4–35)
MCV RBC AUTO: 81.9 FL (ref 82–102)
MONOCYTES # BLD: 0.76 K/UL (ref 0.1–1.3)
MONOCYTES NFR BLD: 9.6 % (ref 4–12)
NEUTS SEG # BLD: 5.38 K/UL (ref 1.7–8.2)
NEUTS SEG NFR BLD: 67.8 % (ref 43–78)
NRBC # BLD: 0 K/UL (ref 0–0.2)
PLATELET # BLD AUTO: 320 K/UL (ref 150–450)
PMV BLD AUTO: 9 FL (ref 9.4–12.3)
POTASSIUM SERPL-SCNC: 4.7 MMOL/L (ref 3.5–5.1)
PROT SERPL-MCNC: 6.7 G/DL (ref 6.3–8.2)
RBC # BLD AUTO: 6.12 M/UL (ref 4.23–5.6)
RETICS # AUTO: 0.07 M/UL (ref 0.03–0.1)
RETICS/RBC NFR AUTO: 1.1 % (ref 0.3–2)
SODIUM SERPL-SCNC: 140 MMOL/L (ref 136–145)
TIBC SERPL-MCNC: 362 UG/DL (ref 240–450)
UIBC SERPL-MCNC: 209 UG/DL (ref 112–347)
VIT B12 SERPL-MCNC: 426 PG/ML (ref 193–986)
WBC # BLD AUTO: 7.9 K/UL (ref 4.3–11.1)

## 2025-03-21 ENCOUNTER — OFFICE VISIT (OUTPATIENT)
Dept: ONCOLOGY | Age: 55
End: 2025-03-21
Payer: OTHER GOVERNMENT

## 2025-03-21 VITALS
TEMPERATURE: 97.4 F | RESPIRATION RATE: 18 BRPM | HEART RATE: 68 BPM | WEIGHT: 207 LBS | OXYGEN SATURATION: 97 % | BODY MASS INDEX: 29.63 KG/M2 | SYSTOLIC BLOOD PRESSURE: 126 MMHG | HEIGHT: 70 IN | DIASTOLIC BLOOD PRESSURE: 80 MMHG

## 2025-03-21 DIAGNOSIS — D50.9 IRON DEFICIENCY ANEMIA, UNSPECIFIED IRON DEFICIENCY ANEMIA TYPE: Primary | ICD-10-CM

## 2025-03-21 LAB
COPPER SERPL-MCNC: 84 UG/DL (ref 69–132)
ZINC SERPL-MCNC: 74 UG/DL (ref 44–115)

## 2025-03-21 PROCEDURE — 99213 OFFICE O/P EST LOW 20 MIN: CPT | Performed by: INTERNAL MEDICINE

## 2025-03-21 NOTE — PATIENT INSTRUCTIONS
Patient Information from Today's Visit    Diagnosis: Iron Deficiency Anemia      Follow Up Instructions: 6 Months      Treatment Summary has been discussed and given to patient: N/A      Current Labs:   Orders Only on 03/19/2025   Component Date Value Ref Range Status    Vit D, 25-Hydroxy 03/19/2025 21.7 (L)  30.0 - 100.0 ng/mL Final    Comment: Deficiency         <20.0 ng/mL  Insufficiency       20.0-29.9 ng/mL      Zinc 03/19/2025 74  44 - 115 ug/dL Final    Comment: (NOTE)  This test was developed and its performance characteristics  determined by [facility]. It has not been cleared or approved  by the Food and Drug Administration.                                 Detection Limit = 5  Performed At:  [facility] [id number]  James Hawkins MD Ph:[phone]      Copper 03/19/2025 84  69 - 132 ug/dL Final    Comment: (NOTE)  This test was developed and its performance characteristics  determined by [facility]. It has not been cleared or approved  by the Food and Drug Administration.                                 Detection Limit = 5  Performed At:  [facility] [id number]  James Hawkins MD Ph:[phone]      Folate 03/19/2025 8.5  3.1 - 17.5 ng/mL Final    Vitamin B-12 03/19/2025 426  193 - 986 pg/mL Final    Iron 03/19/2025 153 (H)  35 - 100 ug/dL Final    TIBC 03/19/2025 362  240 - 450 ug/dL Final    Iron % Saturation 03/19/2025 42  20 - 50 % Final    UIBC 03/19/2025 209.0  112.0 - 347.0 ug/dL Final    Ferritin 03/19/2025 132  8 - 388 NG/ML Final    Reticulocyte Count,Automated 03/19/2025 1.1  0.3 - 2.0 % Final    Absolute Retic # 03/19/2025 0.0661  0.026 - 0.095 M/ul Final    Immature Retic Fraction 03/19/2025 5.3  2.3 - 13.4 % Final    Retic Hemoglobin conc. 03/19/2025 34  29 - 35 pg Final    Sodium 03/19/2025 140  136 - 145 mmol/L Final    Potassium 03/19/2025 4.7  3.5 - 5.1 mmol/L Final    Chloride 03/19/2025 102  98 - 107 mmol/L Final  Pt not checking BG.  HbA1C 7 but suspect some lows given frequency of sx's. Pt with poor diet likely contributing to significant insulin requirements. Pt purposefully not making good food choices since he takes insulin.   Refer to diabetes education.  Increase Metformin to 1000 mg twice daily. egfr >60.   Continue other diabetes meds.  Notify provider if blood sugar consistently < 70. May need to decrease insulin doses.  Check blood sugar before each meal and bedtime. Bring log to next visit. Consider applying for CGM in the future.  Check urine microalbumin wnls. On Ace.  Discussed proper foot care.  Counseled pt on low carb/low fat diet and to increase physical activity.   On statin

## 2025-03-21 NOTE — PROGRESS NOTES
[facility]  [address] [place] [address]  Guillermo Jenkins MD    Patient Name Dawson Knight   MRN [id number]   Date 03/21/25     Hematology/Oncology Diagnosis  ZOILA    History of Present Illness  Dawson Knight is a 54 y.o. with ADRIANA on CPAP, hx of gastric bypass ~20 years prior, chronic back pain who presents for follow-up.     - 12/2024  Established with us for ZOILA.  [facility].  Referred to gastroenterology and recent EGD/c-scope 10/2024 were negative for signs of bleeding.  - 1/4/25  Completed course of ferric carboxymaltose.     Interval History  Presents with his wife for scheduled follow-up.  Feeling well.  Energy improved however still having cramping in his lower extremities at nighttime with sleep.  Taking magnesium at sleep with little relief.  Was hoping that his restless leg syndrome symptoms would improve with IV iron.  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    ROS   12 point review of system negative except as noted in HPI    Past Medical History:   Diagnosis Date    GERD (gastroesophageal reflux disease)     managed with medication    Hypertension     managed with medication    Iron deficiency anemia     ADRIANA on CPAP         Past Surgical History:   Procedure Laterality Date    COLONOSCOPY N/A 10/30/2024    COLORECTAL CANCER SCREENING, NOT HIGH RISK performed by Nate Mane MD at [facility]COPY    HIP ARTHROSCOPY W/ LABRAL REPAIR Right     ROTATOR CUFF REPAIR Right     UPPER GASTROINTESTINAL ENDOSCOPY N/A 10/30/2024    ESOPHAGOGASTRODUODENOSCOPY BIOPSY performed by Nate Mane MD at [facility] END[facility]        Social History     Socioeconomic History    Marital status: Unknown     Spouse name: Not on file    Number of children: Not on file    Years of education: Not on file    Highest education level: Not on file   Occupational History    Not on file   Tobacco Use    Smoking

## 2025-03-22 LAB — METHYLMALONATE SERPL-SCNC: 210 NMOL/L (ref 0–378)

## 2025-06-10 ENCOUNTER — CLINICAL DOCUMENTATION (OUTPATIENT)
Dept: ONCOLOGY | Age: 55
End: 2025-06-10

## 2025-06-10 NOTE — PROGRESS NOTES
[facility] Form [id number] Request for Services Form completed and faxed to [facility] at #[phone] along with the following medical records:    Hematology office visit progress note dated 3/21/25  Lab results from specimens collected 3/19/25     Proficient Transaction ID [id number]

## (undated) DEVICE — MOUTHPIECE ENDOSCP L CTRL OPN AND SIDE PORTS DISP

## (undated) DEVICE — FORCEPS BX L240CM JAW DIA2.8MM L CAP W/ NDL MIC MESH TOOTH

## (undated) DEVICE — NEEDLE SYRINGE 18GA L1.5IN RED PLAS HUB S STL BLNT FILL W/O

## (undated) DEVICE — BLOCK BITE AD 60FR W/ VELC STRP ADDRESSES MOST PT AND

## (undated) DEVICE — SINGLE PORT MANIFOLD: Brand: NEPTUNE 2

## (undated) DEVICE — YANKAUER,BULB TIP,W/O VENT,RIGID,STERILE: Brand: MEDLINE

## (undated) DEVICE — CONNECTOR TBNG OD5-7MM O2 END DISP

## (undated) DEVICE — SYRINGE MED 10ML LUERLOCK TIP W/O SFTY DISP

## (undated) DEVICE — AIRLIFE™ OXYGEN TUBING 7 FEET (2.1 M) CRUSH RESISTANT OXYGEN TUBING, VINYL TIPPED: Brand: AIRLIFE™

## (undated) DEVICE — GAUZE,SPONGE,4"X4",12PLY,WOVEN,NS,LF: Brand: MEDLINE

## (undated) DEVICE — KENDALL RADIOLUCENT FOAM MONITORING ELECTRODE RECTANGULAR SHAPE: Brand: KENDALL

## (undated) DEVICE — BALLOON US E LIN RNG O KT FOR FG-32UA

## (undated) DEVICE — ENDOSCOPIC KIT 1.1+ OP4 CA DE 2 GWN AAMI LEVEL 3

## (undated) DEVICE — CANNULA NSL ORAL AD FOR CAPNOFLEX CO2 O2 AIRLFE

## (undated) DEVICE — SYRINGE MEDICAL 3ML CLEAR PLASTIC STANDARD NON CONTROL LUERLOCK TIP DISPOSABLE

## (undated) DEVICE — CONTAINER FORMALIN PREFILLED 10% NBF 60ML